# Patient Record
(demographics unavailable — no encounter records)

---

## 2025-03-07 NOTE — IMAGING
[de-identified] : The patient is a well appearing 17 year old male of their stated age. Patient ambulates with a normal gait. Negative straight leg raise bilateral   Effected Knee:  LEFT ROM:  0-145 degrees Lachman: Negative Pivot Shift: Negative Anterior Drawer: Negative Posterior Drawer / Sag: Negative Varus Stress 0 degrees: Stable Varus Stress 30 degrees: Stable Valgus Stress 0 degrees: Stable Valgus Stress 30 degrees: Stable Medial Dianne: Negative Lateral Dianne: POSITIVE  Patella Glide: 2+ Patella Apprehension: Negative Patella Grind: Negative   Palpation: Medial Joint Line: Nontender Lateral Joint Line: TENDER  Medial Collateral Ligament: Nontender Lateral Collateral Ligament/PLC: Nontender Distal Femur: Nontender Proximal Tibia: Nontender Tibial Tubercle: Nontender Distal Pole Patella: Nontender Quadriceps Tendon: Nontender &  Intact Patella Tendon: Nontender &  Intact Medial Femoral Condyle: Nontender Medial Distal Hamstring/PES: Nontender Lateral Distal Hamstring: Nontender & Stable Iliotibial Band: Nontender Medial Patellofemoral Ligament: Nontender Adductor: Nontender Proximal GSC-Plantaris: Nontender Calf: Supple & Nontender   Inspection: Deformity: No Erythema: No Ecchymosis: No Abrasions: No Effusion: MODERATE Prepatella Bursitis: No Neurologic Exam: Sensation L4-S1: Grossly Intact Motor Exam: Quadriceps: 4 out of 5 Hamstrings: 5 out of 5 EHL: 5 out of 5 FHL: 5 out of 5 TA: 5 out of 5 GS: 5 out of 5 Circulatory/Pulses: Dorsalis Pedis: 2+ Posterior Tibialis: 2+ Additional Pertinent Findings: None     Contralateral Knee:                       ROM: 0-145 degrees Other Pertinent Findings: None   Assessment: The patient is a 17 year old male with Left knee pain and radiographic and physical exam findings consistent with possible LMT The patient's condition is acute Documents/Results Reviewed Today: X-Ray left knee Tests/Studies Independently Interpreted Today: X-Ray left knee is benign  Pertinent findings include: 0-145, 4/5 quadriceps, moderate effusion, LJLT, + lateral dianne  Confounding medical conditions/concerns: None   Plan: Due to worsening pain and instability with catching/locking mechanical symptoms, patient will obtain MRI left knee to evaluate for possible lateral meniscus tear. In the interim, we reviewed appropriate use of OTC anti-inflammatories as needed for pain, inflammation, and discomfort. Recommended the patient avoid any deep squatting, pivoting, or cutting movements. Modify activity as discussed. Tests Ordered: MRI Left knee Prescription Medications Ordered: Discussed appropriate use of OTC anti-inflammatories and analgesics (including but not limited to Aleve, Advil, Tylenol, Motrin, Ibuprofen, Voltaren gel, etc.) Braces/DME Ordered: None Activity/Work/Sports Status:  avoid deep squatting Additional Instructions: None Follow-Up: s/p MRI  The patient's current medication management of their orthopedic diagnosis was reviewed today: The patient declined and/or was contraindicated for the recommended prescription medication Naprosyn and will use over the counter Advil, Alleve, Voltaren Gel or Tylenol as directed.  (1) We discussed a comprehensive treatment plan that included possible pharmaceutical management involving the use of prescription strength medications versus over the counter oral medications and topical prescription vs over the counter medications.  Based on our extensive discussion, the patient declined prescription medication and will use over the counter Advil, Aleve, Voltaren Gel or Tylenol as directed. (2) There is a moderate risk of morbidity with further treatment, especially from use of prescription strength medications and possible side effects of these medications which include upset stomach with oral medications, skin reactions to topical medications and cardiac/renal issues with long term use. (3) I recommended that the patient follow-up with their medical physician to discuss any significant specific potential issues with long term medication use such as interactions with current medications or with exacerbation of underlying medical comorbidities. (4) The benefits and risks associated with use of injectable, oral or topical, prescription and over the counter anti-inflammatory medications were discussed with the patient. The patient voiced understanding of the risks including but not limited to bleeding, stroke, kidney dysfunction, heart disease, and were referred to the black box warning label for further information.  Radhika GROSS attest that this documentation has been prepared under the direction and in the presence of Mikaela Ruvalcaba PA-C.  The documentation recorded by the scribe accurately reflects the service Mikaela GROSS PA-C, personally performed and the decisions made by me.

## 2025-03-07 NOTE — HISTORY OF PRESENT ILLNESS
[de-identified] : The patient is a 17 year old right hand dominant male who presents today for left knee pain.  Date of Injury/Onset: 10/2022 Pain: At Rest: 0/10 With Activity: 6-7/10 Mechanism of injury: Gradual onset of pain  This is NOT a Work Related Injury being treated under Worker's Compensation. This is NOT an athletic injury occurring associated with an interscholastic or organized sports team. 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

## 2025-03-27 NOTE — IMAGING
[de-identified] : The patient is a well appearing 17 year old male of their stated age. Patient ambulates with a normal gait. Negative straight leg raise bilateral   Effected Knee:  LEFT ROM:  0-145 degrees Lachman: Negative Pivot Shift: Negative Anterior Drawer: Negative Posterior Drawer / Sag: Negative Varus Stress 0 degrees: Stable Varus Stress 30 degrees: Stable Valgus Stress 0 degrees: Stable Valgus Stress 30 degrees: Stable Medial Dianne: Negative Lateral Dianne: POSITIVE  Patella Glide: 2+ Patella Apprehension: Negative Patella Grind: Negative   Palpation: Medial Joint Line: Nontender Lateral Joint Line: TENDER  Medial Collateral Ligament: Nontender Lateral Collateral Ligament/PLC: Nontender Distal Femur: Nontender Proximal Tibia: Nontender Tibial Tubercle: Nontender Distal Pole Patella: Nontender Quadriceps Tendon: Nontender &  Intact Patella Tendon: Nontender &  Intact Medial Femoral Condyle: Nontender Medial Distal Hamstring/PES: Nontender Lateral Distal Hamstring: Nontender & Stable Iliotibial Band: Nontender Medial Patellofemoral Ligament: Nontender Adductor: Nontender Proximal GSC-Plantaris: Nontender Calf: Supple & Nontender   Inspection: Deformity: No Erythema: No Ecchymosis: No Abrasions: No Effusion: MODERATE Prepatella Bursitis: No Neurologic Exam: Sensation L4-S1: Grossly Intact Motor Exam: Quadriceps: 4 out of 5 Hamstrings: 5 out of 5 EHL: 5 out of 5 FHL: 5 out of 5 TA: 5 out of 5 GS: 5 out of 5 Circulatory/Pulses: Dorsalis Pedis: 2+ Posterior Tibialis: 2+ Additional Pertinent Findings: None     Contralateral Knee:                       ROM: 0-145 degrees Other Pertinent Findings: None   Assessment: The patient is a 17 year old male with Left knee pain and radiographic and physical exam findings consistent with chondromalacia patella and PFS The patient's condition is acute Documents/Results Reviewed Today: MRI left knee Tests/Studies Independently Interpreted Today: MRI left knee reveals evidence of chondromalacia patella Pertinent findings include: 0-145, 4/5 quadriceps, moderate effusion, LJLT, + lateral dianne  Confounding medical conditions/concerns: None   Plan: Discussed treatment options for the patient's chondromalacia patella and patellofemoral syndrome. Patient will start physical therapy, HEP, and stretching. Advised patient to obtain Reparel sleeve. Prescribed patient Naproxen 500mg BID x 2 weeks, then PRN for pain management and inflammation - use as directed and take with food. Recommended obtaining full-length shoe inserts and utilizing Voltaren gel. Modify activity as discussed. Tests Ordered: None   Prescription Medications Ordered: Naproxen 500 mg  Braces/DME Ordered: Reparel sleeve Activity/Work/Sports Status:  avoid deep squatting Additional Instructions: shoe inserts, Voltaren gel Follow-Up: 4 weeks   The patient's current medication management of their orthopedic diagnosis was reviewed today: The patient was prescribed Naprosyn 500mg BID for two weeks and then as needed. (1) We discussed a comprehensive treatment plan that included possible pharmaceutical management involving the use of prescription strength medications versus over the counter oral medications and topical prescription vs over the counter medications.  Based on our extensive discussion, the patient was prescribed Naprosyn 500mg BID for two weeks.  It will then be used PRN for pain, inflammation and discomfort. (2) There is a moderate risk of morbidity with further treatment, especially from use of prescription strength medications and possible side effects of these medications which include upset stomach with oral medications, skin reactions to topical medications and cardiac/renal issues with long term use. (3) I recommended that the patient follow-up with their medical physician to discuss any significant specific potential issues with long term medication use such as interactions with current medications or with exacerbation of underlying medical comorbidities. (4) The benefits and risks associated with use of injectable, oral or topical, prescription and over the counter anti-inflammatory medications were discussed with the patient. The patient voiced understanding of the risks including but not limited to bleeding, stroke, kidney dysfunction, heart disease, and were referred to the black box warning label for further information.  Radhika GROSS attest that this documentation has been prepared under the direction and in the presence of Provider Dr. Loco Delatorre.  The documentation recorded by the scribe accurately reflects the services IDr. Loco, personally performed and the decisions made by me.

## 2025-03-27 NOTE — HISTORY OF PRESENT ILLNESS
[de-identified] : The patient is a 17 year old right hand dominant male who presents today for left knee pain.  Date of Injury/Onset: 10/2022 Pain: At Rest: 0/10 With Activity: 6-7/10 Mechanism of injury: Gradual onset of pain  This is NOT a Work Related Injury being treated under Worker's Compensation. This is NOT an athletic injury occurring associated with an interscholastic or organized sports team. 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 Here to review MRI results.  Additional Information: None

## 2025-03-27 NOTE — DATA REVIEWED
[MRI] : MRI [Left] : left [Knee] : knee [Report was reviewed and noted in the chart] : The report was reviewed and noted in the chart [I independently reviewed and interpreted images and report] : I independently reviewed and interpreted images and report [I reviewed the films/CD and additionally noted] : I reviewed the films/CD and additionally noted [FreeTextEntry1] : MRI left knee reveals evidence of chondromalacia,

## 2025-03-27 NOTE — HISTORY OF PRESENT ILLNESS
[de-identified] : The patient is a 17 year old right hand dominant male who presents today for left knee pain.  Date of Injury/Onset: 10/2022 Pain: At Rest: 0/10 With Activity: 6-7/10 Mechanism of injury: Gradual onset of pain  This is NOT a Work Related Injury being treated under Worker's Compensation. This is NOT an athletic injury occurring associated with an interscholastic or organized sports team. 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 Here to review MRI results.  Additional Information: None

## 2025-03-27 NOTE — IMAGING
[de-identified] : The patient is a well appearing 17 year old male of their stated age. Patient ambulates with a normal gait. Negative straight leg raise bilateral   Effected Knee:  LEFT ROM:  0-145 degrees Lachman: Negative Pivot Shift: Negative Anterior Drawer: Negative Posterior Drawer / Sag: Negative Varus Stress 0 degrees: Stable Varus Stress 30 degrees: Stable Valgus Stress 0 degrees: Stable Valgus Stress 30 degrees: Stable Medial Dianne: Negative Lateral Dianne: POSITIVE  Patella Glide: 2+ Patella Apprehension: Negative Patella Grind: Negative   Palpation: Medial Joint Line: Nontender Lateral Joint Line: TENDER  Medial Collateral Ligament: Nontender Lateral Collateral Ligament/PLC: Nontender Distal Femur: Nontender Proximal Tibia: Nontender Tibial Tubercle: Nontender Distal Pole Patella: Nontender Quadriceps Tendon: Nontender &  Intact Patella Tendon: Nontender &  Intact Medial Femoral Condyle: Nontender Medial Distal Hamstring/PES: Nontender Lateral Distal Hamstring: Nontender & Stable Iliotibial Band: Nontender Medial Patellofemoral Ligament: Nontender Adductor: Nontender Proximal GSC-Plantaris: Nontender Calf: Supple & Nontender   Inspection: Deformity: No Erythema: No Ecchymosis: No Abrasions: No Effusion: MODERATE Prepatella Bursitis: No Neurologic Exam: Sensation L4-S1: Grossly Intact Motor Exam: Quadriceps: 4 out of 5 Hamstrings: 5 out of 5 EHL: 5 out of 5 FHL: 5 out of 5 TA: 5 out of 5 GS: 5 out of 5 Circulatory/Pulses: Dorsalis Pedis: 2+ Posterior Tibialis: 2+ Additional Pertinent Findings: None     Contralateral Knee:                       ROM: 0-145 degrees Other Pertinent Findings: None   Assessment: The patient is a 17 year old male with Left knee pain and radiographic and physical exam findings consistent with chondromalacia patella and PFS The patient's condition is acute Documents/Results Reviewed Today: MRI left knee Tests/Studies Independently Interpreted Today: MRI left knee reveals evidence of chondromalacia patella Pertinent findings include: 0-145, 4/5 quadriceps, moderate effusion, LJLT, + lateral dianne  Confounding medical conditions/concerns: None   Plan: Discussed treatment options for the patient's chondromalacia patella and patellofemoral syndrome. Patient will start physical therapy, HEP, and stretching. Advised patient to obtain Reparel sleeve. Prescribed patient Naproxen 500mg BID x 2 weeks, then PRN for pain management and inflammation - use as directed and take with food. Recommended obtaining full-length shoe inserts and utilizing Voltaren gel. Modify activity as discussed. Tests Ordered: None   Prescription Medications Ordered: Naproxen 500 mg  Braces/DME Ordered: Reparel sleeve Activity/Work/Sports Status:  avoid deep squatting Additional Instructions: shoe inserts, Voltaren gel Follow-Up: 4 weeks   The patient's current medication management of their orthopedic diagnosis was reviewed today: The patient was prescribed Naprosyn 500mg BID for two weeks and then as needed. (1) We discussed a comprehensive treatment plan that included possible pharmaceutical management involving the use of prescription strength medications versus over the counter oral medications and topical prescription vs over the counter medications.  Based on our extensive discussion, the patient was prescribed Naprosyn 500mg BID for two weeks.  It will then be used PRN for pain, inflammation and discomfort. (2) There is a moderate risk of morbidity with further treatment, especially from use of prescription strength medications and possible side effects of these medications which include upset stomach with oral medications, skin reactions to topical medications and cardiac/renal issues with long term use. (3) I recommended that the patient follow-up with their medical physician to discuss any significant specific potential issues with long term medication use such as interactions with current medications or with exacerbation of underlying medical comorbidities. (4) The benefits and risks associated with use of injectable, oral or topical, prescription and over the counter anti-inflammatory medications were discussed with the patient. The patient voiced understanding of the risks including but not limited to bleeding, stroke, kidney dysfunction, heart disease, and were referred to the black box warning label for further information.  Radhika GROSS attest that this documentation has been prepared under the direction and in the presence of Provider Dr. Loco Delatorre.  The documentation recorded by the scribe accurately reflects the services IDr. Loco, personally performed and the decisions made by me.